# Patient Record
Sex: MALE | URBAN - METROPOLITAN AREA
[De-identification: names, ages, dates, MRNs, and addresses within clinical notes are randomized per-mention and may not be internally consistent; named-entity substitution may affect disease eponyms.]

---

## 2022-09-12 ENCOUNTER — CLAIMS CREATED FROM THE CLAIM WINDOW (OUTPATIENT)
Dept: URBAN - METROPOLITAN AREA MEDICAL CENTER 19 | Facility: MEDICAL CENTER | Age: 42
End: 2022-09-12
Payer: SELF-PAY

## 2022-09-12 DIAGNOSIS — R63.4 ABNORMAL WEIGHT LOSS: ICD-10-CM

## 2022-09-12 DIAGNOSIS — R93.3 ABN FINDINGS-GI TRACT: ICD-10-CM

## 2022-09-12 DIAGNOSIS — R10.817 GENERALIZED ABDOMINAL TENDERNESS: ICD-10-CM

## 2022-09-12 DIAGNOSIS — R19.7 CHRONIC DIARRHEA: ICD-10-CM

## 2022-09-12 DIAGNOSIS — R68.81 EARLY SATIETY: ICD-10-CM

## 2022-09-12 PROCEDURE — 99254 IP/OBS CNSLTJ NEW/EST MOD 60: CPT | Performed by: INTERNAL MEDICINE

## 2022-09-13 ENCOUNTER — CLAIMS CREATED FROM THE CLAIM WINDOW (OUTPATIENT)
Dept: URBAN - METROPOLITAN AREA MEDICAL CENTER 19 | Facility: MEDICAL CENTER | Age: 42
End: 2022-09-13
Payer: SELF-PAY

## 2022-09-13 DIAGNOSIS — R93.5 ABDOMINAL ULTRASOUND, ABNORMAL: ICD-10-CM

## 2022-09-13 DIAGNOSIS — R63.4 ABNORMAL WEIGHT LOSS: ICD-10-CM

## 2022-09-13 DIAGNOSIS — R19.7 ACUTE DIARRHEA: ICD-10-CM

## 2022-09-13 DIAGNOSIS — R97.0 CARCINOEMBRYONIC ANTIGEN (CEA) ELEVATION: ICD-10-CM

## 2022-09-13 DIAGNOSIS — R10.817 ABDOMINAL TENDERNESS, GENERALIZED: ICD-10-CM

## 2022-09-13 PROCEDURE — 99232 SBSQ HOSP IP/OBS MODERATE 35: CPT | Performed by: INTERNAL MEDICINE

## 2022-09-14 ENCOUNTER — CLAIMS CREATED FROM THE CLAIM WINDOW (OUTPATIENT)
Dept: URBAN - METROPOLITAN AREA MEDICAL CENTER 19 | Facility: MEDICAL CENTER | Age: 42
End: 2022-09-14
Payer: SELF-PAY

## 2022-09-14 DIAGNOSIS — R93.5 ABDOMINAL ULTRASOUND, ABNORMAL: ICD-10-CM

## 2022-09-14 DIAGNOSIS — R10.813 RLQ ABDOMINAL TENDERNESS: ICD-10-CM

## 2022-09-14 DIAGNOSIS — R63.4 ABNORMAL WEIGHT LOSS: ICD-10-CM

## 2022-09-14 DIAGNOSIS — R97.0 ELEVATED CA19-9: ICD-10-CM

## 2022-09-14 DIAGNOSIS — R10.811 RUQ ABDOMINAL TENDERNESS: ICD-10-CM

## 2022-09-14 DIAGNOSIS — R19.7 CHRONIC DIARRHEA: ICD-10-CM

## 2022-09-14 PROCEDURE — 99232 SBSQ HOSP IP/OBS MODERATE 35: CPT | Performed by: INTERNAL MEDICINE

## 2022-09-15 ENCOUNTER — CLAIMS CREATED FROM THE CLAIM WINDOW (OUTPATIENT)
Dept: URBAN - METROPOLITAN AREA MEDICAL CENTER 19 | Facility: MEDICAL CENTER | Age: 42
End: 2022-09-15
Payer: SELF-PAY

## 2022-09-15 DIAGNOSIS — R93.5 ABDOMINAL ULTRASOUND, ABNORMAL: ICD-10-CM

## 2022-09-15 DIAGNOSIS — R63.4 ABNORMAL WEIGHT LOSS: ICD-10-CM

## 2022-09-15 DIAGNOSIS — R97.0 CARCINOEMBRYONIC ANTIGEN (CEA) ELEVATION: ICD-10-CM

## 2022-09-15 DIAGNOSIS — R10.817 ABDOMINAL TENDERNESS, GENERALIZED: ICD-10-CM

## 2022-09-15 DIAGNOSIS — R19.7 ACUTE DIARRHEA: ICD-10-CM

## 2022-09-15 PROCEDURE — 99232 SBSQ HOSP IP/OBS MODERATE 35: CPT | Performed by: INTERNAL MEDICINE

## 2022-09-16 ENCOUNTER — CLAIMS CREATED FROM THE CLAIM WINDOW (OUTPATIENT)
Dept: URBAN - METROPOLITAN AREA MEDICAL CENTER 19 | Facility: MEDICAL CENTER | Age: 42
End: 2022-09-16
Payer: SELF-PAY

## 2022-09-16 DIAGNOSIS — R63.4 ABNORMAL WEIGHT LOSS: ICD-10-CM

## 2022-09-16 DIAGNOSIS — R93.3 ABN FINDINGS-GI TRACT: ICD-10-CM

## 2022-09-16 DIAGNOSIS — K64.1 BLEEDING GRADE II HEMORRHOIDS: ICD-10-CM

## 2022-09-16 PROCEDURE — 45378 DIAGNOSTIC COLONOSCOPY: CPT | Performed by: INTERNAL MEDICINE

## 2022-09-16 PROCEDURE — 43235 EGD DIAGNOSTIC BRUSH WASH: CPT | Performed by: INTERNAL MEDICINE

## 2024-09-05 ENCOUNTER — ENROLLMENT (OUTPATIENT)
Dept: HOME HEALTH SERVICES | Facility: HOME HEALTH | Age: 44
End: 2024-09-05
Payer: COMMERCIAL

## 2024-09-24 NOTE — PROGRESS NOTES
06/18/2024: PT HAS HOME DISCONNECT COVERAGE. SV  PT HAS TPA, HYDRATION, LINE CARE AND ONPRO COVERAGE.

## 2024-10-18 ENCOUNTER — HOME INFUSION (OUTPATIENT)
Dept: HOME HEALTH SERVICES | Facility: HOME HEALTH | Age: 44
End: 2024-10-18
Payer: COMMERCIAL

## 2024-10-18 VITALS — WEIGHT: 97 LBS | HEIGHT: 65 IN | BODY MASS INDEX: 16.16 KG/M2

## 2024-10-18 DIAGNOSIS — C22.1 INTRAHEPATIC BILE DUCT CARCINOMA (H): Primary | ICD-10-CM

## 2024-10-29 ENCOUNTER — HOME INFUSION (OUTPATIENT)
Dept: HOME HEALTH SERVICES | Facility: HOME HEALTH | Age: 44
End: 2024-10-29
Payer: COMMERCIAL

## 2024-10-29 DIAGNOSIS — C22.1 INTRAHEPATIC BILE DUCT CARCINOMA (H): Primary | ICD-10-CM

## 2024-11-01 ENCOUNTER — HOME INFUSION BILLING (OUTPATIENT)
Dept: HOME HEALTH SERVICES | Facility: HOME HEALTH | Age: 44
End: 2024-11-01
Payer: COMMERCIAL

## 2024-11-04 PROCEDURE — S9330 HIT CONT CHEM DIEM: HCPCS

## 2024-11-05 PROCEDURE — S9330 HIT CONT CHEM DIEM: HCPCS

## 2024-11-12 ENCOUNTER — HOME INFUSION (OUTPATIENT)
Dept: HOME HEALTH SERVICES | Facility: HOME HEALTH | Age: 44
End: 2024-11-12
Payer: COMMERCIAL

## 2024-11-12 DIAGNOSIS — C22.1 INTRAHEPATIC BILE DUCT CARCINOMA (H): ICD-10-CM

## 2024-11-15 ENCOUNTER — HOME INFUSION BILLING (OUTPATIENT)
Dept: HOME HEALTH SERVICES | Facility: HOME HEALTH | Age: 44
End: 2024-11-15
Payer: COMMERCIAL

## 2024-11-18 PROCEDURE — S9330 HIT CONT CHEM DIEM: HCPCS

## 2024-11-19 PROCEDURE — S9330 HIT CONT CHEM DIEM: HCPCS

## 2024-11-26 ENCOUNTER — HOME INFUSION (OUTPATIENT)
Dept: HOME HEALTH SERVICES | Facility: HOME HEALTH | Age: 44
End: 2024-11-26
Payer: COMMERCIAL

## 2024-11-26 DIAGNOSIS — C22.1 INTRAHEPATIC BILE DUCT CARCINOMA (H): ICD-10-CM

## 2024-11-29 ENCOUNTER — HOME INFUSION BILLING (OUTPATIENT)
Dept: HOME HEALTH SERVICES | Facility: HOME HEALTH | Age: 44
End: 2024-11-29
Payer: COMMERCIAL

## 2024-12-13 ENCOUNTER — HOME INFUSION BILLING (OUTPATIENT)
Dept: HOME HEALTH SERVICES | Facility: HOME HEALTH | Age: 44
End: 2024-12-13
Payer: COMMERCIAL

## 2024-12-27 ENCOUNTER — HOME INFUSION BILLING (OUTPATIENT)
Dept: HOME HEALTH SERVICES | Facility: HOME HEALTH | Age: 44
End: 2024-12-27
Payer: COMMERCIAL

## 2024-12-31 PROCEDURE — S9330 HIT CONT CHEM DIEM: HCPCS

## 2025-01-01 PROCEDURE — S9330 HIT CONT CHEM DIEM: HCPCS

## 2025-01-02 PROCEDURE — S9330 HIT CONT CHEM DIEM: HCPCS

## 2025-01-03 PROCEDURE — S9330 HIT CONT CHEM DIEM: HCPCS

## 2025-01-04 PROCEDURE — S9330 HIT CONT CHEM DIEM: HCPCS

## 2025-01-05 PROCEDURE — S9330 HIT CONT CHEM DIEM: HCPCS

## 2025-01-06 PROCEDURE — S9330 HIT CONT CHEM DIEM: HCPCS

## 2025-01-07 PROCEDURE — S9330 HIT CONT CHEM DIEM: HCPCS

## 2025-01-08 ENCOUNTER — HOME INFUSION (OUTPATIENT)
Dept: HOME HEALTH SERVICES | Facility: HOME HEALTH | Age: 45
End: 2025-01-08
Payer: COMMERCIAL

## 2025-01-08 PROCEDURE — S9330 HIT CONT CHEM DIEM: HCPCS

## 2025-01-09 PROCEDURE — S9330 HIT CONT CHEM DIEM: HCPCS

## 2025-01-10 ENCOUNTER — HOME INFUSION BILLING (OUTPATIENT)
Dept: HOME HEALTH SERVICES | Facility: HOME HEALTH | Age: 45
End: 2025-01-10
Payer: COMMERCIAL

## 2025-01-10 PROCEDURE — S9330 HIT CONT CHEM DIEM: HCPCS

## 2025-01-11 PROCEDURE — S9330 HIT CONT CHEM DIEM: HCPCS

## 2025-01-12 PROCEDURE — S9330 HIT CONT CHEM DIEM: HCPCS

## 2025-01-13 PROCEDURE — S9330 HIT CONT CHEM DIEM: HCPCS

## 2025-01-14 PROCEDURE — S9330 HIT CONT CHEM DIEM: HCPCS

## 2025-01-15 PROCEDURE — S9330 HIT CONT CHEM DIEM: HCPCS

## 2025-01-16 PROCEDURE — S9330 HIT CONT CHEM DIEM: HCPCS

## 2025-01-17 PROCEDURE — S9330 HIT CONT CHEM DIEM: HCPCS

## 2025-01-18 PROCEDURE — S9330 HIT CONT CHEM DIEM: HCPCS

## 2025-01-19 PROCEDURE — S9330 HIT CONT CHEM DIEM: HCPCS

## 2025-01-20 PROCEDURE — S9330 HIT CONT CHEM DIEM: HCPCS

## 2025-01-21 PROCEDURE — S9330 HIT CONT CHEM DIEM: HCPCS

## 2025-01-22 PROCEDURE — S9330 HIT CONT CHEM DIEM: HCPCS

## 2025-01-23 ENCOUNTER — HOME INFUSION (OUTPATIENT)
Dept: HOME HEALTH SERVICES | Facility: HOME HEALTH | Age: 45
End: 2025-01-23
Payer: COMMERCIAL

## 2025-01-23 DIAGNOSIS — C22.1 INTRAHEPATIC BILE DUCT CARCINOMA (H): ICD-10-CM

## 2025-01-23 PROCEDURE — S9330 HIT CONT CHEM DIEM: HCPCS

## 2025-01-24 PROCEDURE — S9330 HIT CONT CHEM DIEM: HCPCS

## 2025-01-25 PROCEDURE — S9330 HIT CONT CHEM DIEM: HCPCS

## 2025-01-26 PROCEDURE — S9330 HIT CONT CHEM DIEM: HCPCS

## 2025-01-27 PROCEDURE — S9330 HIT CONT CHEM DIEM: HCPCS

## 2025-01-29 ENCOUNTER — MEDICAL CORRESPONDENCE (OUTPATIENT)
Dept: HOME HEALTH SERVICES | Facility: HOME HEALTH | Age: 45
End: 2025-01-29
Payer: COMMERCIAL

## 2025-01-31 ENCOUNTER — HOME INFUSION BILLING (OUTPATIENT)
Dept: HOME HEALTH SERVICES | Facility: HOME HEALTH | Age: 45
End: 2025-01-31
Payer: COMMERCIAL

## 2025-02-05 PROCEDURE — S9330 HIT CONT CHEM DIEM: HCPCS

## 2025-02-06 PROCEDURE — S9330 HIT CONT CHEM DIEM: HCPCS

## 2025-02-07 PROCEDURE — S9330 HIT CONT CHEM DIEM: HCPCS

## 2025-02-08 PROCEDURE — S9330 HIT CONT CHEM DIEM: HCPCS

## 2025-02-09 PROCEDURE — S9330 HIT CONT CHEM DIEM: HCPCS

## 2025-02-10 PROCEDURE — S9330 HIT CONT CHEM DIEM: HCPCS

## 2025-02-11 ENCOUNTER — HOME INFUSION (OUTPATIENT)
Dept: HOME HEALTH SERVICES | Facility: HOME HEALTH | Age: 45
End: 2025-02-11
Payer: COMMERCIAL

## 2025-02-11 DIAGNOSIS — C22.1 INTRAHEPATIC BILE DUCT CARCINOMA (H): ICD-10-CM

## 2025-02-11 PROCEDURE — S9330 HIT CONT CHEM DIEM: HCPCS

## 2025-02-12 PROCEDURE — S9330 HIT CONT CHEM DIEM: HCPCS

## 2025-02-13 PROCEDURE — S9330 HIT CONT CHEM DIEM: HCPCS

## 2025-02-14 ENCOUNTER — HOME INFUSION BILLING (OUTPATIENT)
Dept: HOME HEALTH SERVICES | Facility: HOME HEALTH | Age: 45
End: 2025-02-14
Payer: COMMERCIAL

## 2025-02-14 PROCEDURE — S9330 HIT CONT CHEM DIEM: HCPCS

## 2025-02-15 PROCEDURE — S9330 HIT CONT CHEM DIEM: HCPCS

## 2025-02-16 PROCEDURE — S9330 HIT CONT CHEM DIEM: HCPCS

## 2025-02-17 PROCEDURE — S9330 HIT CONT CHEM DIEM: HCPCS

## 2025-02-18 PROCEDURE — S9330 HIT CONT CHEM DIEM: HCPCS

## 2025-02-19 PROCEDURE — S9330 HIT CONT CHEM DIEM: HCPCS

## 2025-02-28 ENCOUNTER — HOME INFUSION BILLING (OUTPATIENT)
Dept: HOME HEALTH SERVICES | Facility: HOME HEALTH | Age: 45
End: 2025-02-28
Payer: COMMERCIAL

## 2025-03-03 PROCEDURE — S9330 HIT CONT CHEM DIEM: HCPCS

## 2025-03-04 PROCEDURE — S9330 HIT CONT CHEM DIEM: HCPCS

## 2025-03-05 PROCEDURE — S9330 HIT CONT CHEM DIEM: HCPCS

## 2025-04-29 ENCOUNTER — PATIENT OUTREACH (OUTPATIENT)
Dept: ONCOLOGY | Facility: CLINIC | Age: 45
End: 2025-04-29

## 2025-04-29 ENCOUNTER — TRANSCRIBE ORDERS (OUTPATIENT)
Dept: OTHER | Age: 45
End: 2025-04-29

## 2025-04-29 ENCOUNTER — PRE VISIT (OUTPATIENT)
Dept: ONCOLOGY | Facility: CLINIC | Age: 45
End: 2025-04-29

## 2025-04-29 DIAGNOSIS — C22.1 CHOLANGIOCARCINOMA (H): Primary | ICD-10-CM

## 2025-04-29 NOTE — TELEPHONE ENCOUNTER
RECORDS STATUS - ALL OTHER DIAGNOSIS      RECORDS RECEIVED FROM:    DIAGNOSIS: Cholangiocarcinoma (H) [C22.1]    NOTES STATUS DETAILS   OFFICE NOTE from referring provider -NM 04/28/25: Dr. Edin Nathan   OFFICE NOTE from medical oncologist     OFFICE NOTE from other specialist     DISCHARGE SUMMARY from hospital     DISCHARGE REPORT from the ER     OPERATIVE REPORT     MEDICATION LIST     LABS     PATHOLOGY REPORTS     ANYTHING RELATED TO DIAGNOSIS     PATHOLOGY FEDEX TRACKING   Tracking #:   GENONOMIC TESTING     TYPE:     IMAGING (NEED IMAGES & REPORT)     CT SCANS PACS/Req 04/29 NM:  04/21/25-08/21/23: CT CAP    [facility] ([place]):  05/24/23: CT CAP  09/11/22: CT AP  03/15/22: CTA Pulm   MRI  [facility] ([place]):  09/12/22: MR Abd   XRAYS PACS/Req 04/29 NM:  08/28/24: XR Chest    [facility] ([place]):  03/15/22: XR Chest   ULTRASOUND PACS/Req 04/29 NM:  02/04/25, 04/08/24: US Liver    [facility] ([place]):  09/12/22: US Liver   PET     IMAGE DISC FEDEX TRACKING   Tracking #:

## 2025-04-29 NOTE — PROGRESS NOTES
New Patient Oncology Nurse Navigator Note     Referring provider: Dr. Edin Nathan    Referring Clinic/Organization: [facility]   Referred to: Medical Oncology - [facility]  Requested provider (if applicable): First available - did not specify   Referral Received: 04/29/25       Evaluation for :   Diagnosis   C22.1 (ICD-10-CM) - Cholangiocarcinoma (H)      Reason for Referral:  [facility]/Phase 1 Clinic  Additional Information:  ref recd by fax from Dr Edin Nathan, [facility]/phase 1 study clinic/cholangiocarcinoma Metastatic to liver/Dx & Bx on 9/12/22 at [facility] [phone]. Recs faxed, Images pushed    Clinical History (per Nurse review of records provided):      09/12/2022 Tissue exam (scanned media) showed liver mass biopsy showing adenocarcinoma.     02/04/2025 Surgical Pathology (Care Everywhere) showed:   Final Diagnosis    Liver, left lobe mass, biopsy - Adenocarcinoma, morphologically consistent with cholangiocarcinoma   Electronically signed by Alyx Villa MD on 2/5/2025 at  8:54 AM   Clinical Information    Cholangiocarcinoma. Additional tissue needed for molecular testing.       04/21/2025 CT Chest Abdomen Pelvis w/o contrast (Care Everywhere) showed:   IMPRESSION:     The liver is extensively involved by tumor, with multiple growing hypodense hepatic masses in keeping with progression of hepatic metastasis. No evidence of metastasis outside the liver within the abdomen or pelvis.     No adverse change in the appearance of chest; no findings suggestive of metastasis within the chest.     Component  Ref Range & Units 1 d ago   CA 19.9 (GI)  <35.0 units/mL 306 High      Component  Ref Range & Units 1 d ago Comments   SODIUM OP  128 - 145 mmol/L 137    POTASSIUM OP  3.6 - 5.1 mmol/L 5.1    CHLORIDE OP  98 - 108 mmol/L 112 High     CARBON DIOXIDE OP  18 - 33 mmol/L 20    BUN (UREA NITRO) OP  7 - 22 mg/dL 23 High     CREATININE OP  0.60 - 1.20 mg/dL  2.4 High     EST GFR (CKD-EPI) OP  >60 mL/min/1.73m2 33 Low  Calculation based on the Chronic Kidney Disease Epidemiology Collaboration (CKD-EPI) equation refit without adjustment for race.   GLUCOSE OP  73 - 118 mg/dL 92    CALCIUM, SERUM OP  8.0 - 10.3 mg/dL 9.6    ANION GAP OP  -4.0 - 12.0 mmol/L 5    ALBUMIN OP  3.3 - 5.5 g/dL 4    BILIRUBIN-TOTAL OP  0.2 - 1.6 mg/dL 0.6    ALKALINE P'TASE OP  45 - 117 IU/L 220 High     PROTEIN TOTAL OP  6.4 - 8.1 g/dL 7.4    AST (SGOT) OP  11 - 38 IU/L 54 High     ALT (SGPT) OP  10 - 47 IU/L 65 High       04/28/2025 OV note from referring provider (Care Everywhere) discusses referral     Clinical Assessment / Barriers to Care (Per Nurse):    Records Location: [facility], Care Everywhere   Records Needed: Outside CT, MRI, US images from [facility] (referral says images have been pushed?)  Additional testing needed prior to consult: None  Referral updates and Plan:     4/29: Marked for records needed    PALU SandraN, RN, OCN  [facility] Oncology Nurse Navigator  [phone] / [phone]     Afib, s/p PPM, on cardizem and eliquis for AC  - EKG with NSR, 1 degree AV block  - will c/w home medications